# Patient Record
Sex: FEMALE | URBAN - METROPOLITAN AREA
[De-identification: names, ages, dates, MRNs, and addresses within clinical notes are randomized per-mention and may not be internally consistent; named-entity substitution may affect disease eponyms.]

---

## 2021-11-15 ENCOUNTER — APPOINTMENT (RX ONLY)
Dept: URBAN - METROPOLITAN AREA CLINIC 41 | Facility: CLINIC | Age: 41
Setting detail: DERMATOLOGY
End: 2021-11-15

## 2021-11-15 DIAGNOSIS — L81.1 CHLOASMA: ICD-10-CM | Status: STABLE

## 2021-11-15 PROCEDURE — ? PRESCRIPTION

## 2021-11-15 PROCEDURE — ? PRESCRIPTION MEDICATION MANAGEMENT

## 2021-11-15 PROCEDURE — ? ADDITIONAL NOTES

## 2021-11-15 PROCEDURE — ? COUNSELING

## 2021-11-15 PROCEDURE — 99203 OFFICE O/P NEW LOW 30 MIN: CPT

## 2021-11-15 RX ORDER — TAZAROTENE 0.05 MG/G
CREAM CUTANEOUS
Qty: 60 | Refills: 3 | Status: CANCELLED | COMMUNITY
Start: 2021-11-15

## 2021-11-15 RX ORDER — HYDROQUINONE 4 %
CREAM (GRAM) TOPICAL
Qty: 30 | Refills: 2 | Status: CANCELLED | COMMUNITY
Start: 2021-11-15

## 2021-11-15 RX ADMIN — Medication: at 00:00

## 2021-11-15 RX ADMIN — TAZAROTENE: 0.05 CREAM CUTANEOUS at 00:00

## 2021-11-15 ASSESSMENT — LOCATION SIMPLE DESCRIPTION DERM
LOCATION SIMPLE: RIGHT CHEEK
LOCATION SIMPLE: LEFT CHEEK
LOCATION SIMPLE: RIGHT FOREHEAD

## 2021-11-15 ASSESSMENT — LOCATION ZONE DERM: LOCATION ZONE: FACE

## 2021-11-15 ASSESSMENT — LOCATION DETAILED DESCRIPTION DERM
LOCATION DETAILED: RIGHT CENTRAL MALAR CHEEK
LOCATION DETAILED: LEFT CENTRAL MALAR CHEEK
LOCATION DETAILED: RIGHT MEDIAL FOREHEAD

## 2021-11-15 NOTE — PROCEDURE: COUNSELING
Detail Level: Detailed
Patient Specific Counseling (Will Not Stick From Patient To Patient): DK notes if these medications fail we can consider cosmetic procedures in the future. DK notes this is challenging to treat and each person will have different results. Pt showed DK a picture of a product that Dr. Pinzon has either recommended or been associated with. We advised pt that we don’t prescribe that product, but there are several other creams that we can get started with. Usually at our 3month follow up, if the prescription does not seen to be helping, we can do other cosmeceutical products or procedures and pt would talk more with our

## 2021-11-15 NOTE — HPI: DISCOLORATION
Additional History: New pt\\nPt notes discoloration on the face that started four years ago and has only gotten worse. Patient has not tried any treatments for this.

## 2021-11-15 NOTE — PROCEDURE: PRESCRIPTION MEDICATION MANAGEMENT
Render In Strict Bullet Format?: No
Detail Level: Zone
Initiate Treatment: Hydroquinone 4%, tazorac 0.05 cream qhs

## 2021-11-16 ENCOUNTER — RX ONLY (OUTPATIENT)
Age: 41
Setting detail: RX ONLY
End: 2021-11-16

## 2021-11-16 RX ORDER — HYDROQUINONE 4 %
CREAM (GRAM) TOPICAL
Qty: 1 | Refills: 2 | Status: ERX | COMMUNITY
Start: 2021-11-16

## 2021-11-16 RX ORDER — TAZAROTENE 0.05 MG/G
CREAM CUTANEOUS
Qty: 60 | Refills: 3 | Status: ERX | COMMUNITY
Start: 2021-11-16

## 2021-11-19 ENCOUNTER — RX ONLY (OUTPATIENT)
Age: 41
Setting detail: RX ONLY
End: 2021-11-19

## 2021-11-19 RX ORDER — TAZAROTENE 0.05 MG/G
CREAM CUTANEOUS
Qty: 60 | Refills: 3 | Status: ERX | COMMUNITY
Start: 2021-11-19